# Patient Record
Sex: MALE | Race: WHITE | NOT HISPANIC OR LATINO | URBAN - METROPOLITAN AREA
[De-identification: names, ages, dates, MRNs, and addresses within clinical notes are randomized per-mention and may not be internally consistent; named-entity substitution may affect disease eponyms.]

---

## 2017-10-25 ENCOUNTER — IMPORTED ENCOUNTER (OUTPATIENT)
Dept: URBAN - METROPOLITAN AREA CLINIC 1 | Facility: CLINIC | Age: 57
End: 2017-10-25

## 2017-10-25 PROBLEM — H52.03: Noted: 2017-10-25

## 2017-10-25 PROBLEM — H52.4: Noted: 2017-10-25

## 2017-10-25 PROCEDURE — S0621 ROUTINE OPHTHALMOLOGICAL EXA: HCPCS

## 2017-10-25 NOTE — PATIENT DISCUSSION
1.  Hyperopia: Rx was given for corrective spectacles if indicated. 2.  [id number]. Dry Eyes w/ PEK OU 4.  K Scar OU 5. Cataract OU Return for an appointment in 1 week cc with Dr. Ren Méndez.

## 2017-11-01 ENCOUNTER — IMPORTED ENCOUNTER (OUTPATIENT)
Dept: URBAN - METROPOLITAN AREA CLINIC 1 | Facility: CLINIC | Age: 57
End: 2017-11-01

## 2018-03-01 NOTE — PATIENT DISCUSSION
Dysport recommended. Will follow up in 2 weeks and will perform at that time. Pt is having acupuncture done today and would like to wait.

## 2018-03-01 NOTE — PROCEDURE NOTE: CLINICAL
PROCEDURE NOTE: Restylane refyne ( marinonette lines and nasolabial folds) OU. Diagnosis: Rhytids. Prior to the treatment, the risks/benefits/alternatives were discussed. The patient wished to proceed with the procedure. Lot #: * Expiration date: * Total ml used: * Refer to template for location and number of injections. Patient tolerated the procedure well. There were no complications, post procedure instructions were given. Claudia Gomez

## 2018-03-16 NOTE — PROCEDURE NOTE: CLINICAL
PROCEDURE NOTE: Restylane defyne OU. Diagnosis: Rhytids. Prior to the treatment, the risks/benefits/alternatives were discussed. The patient wished to proceed with the procedure. Lot #: [id number]* Expiration date: 6/30/2019 Total ml used: 1 Refer to template for location and number of injections. Patient tolerated the procedure well. There were no complications, post procedure instructions were given. Chinedu Greening PROCEDURE NOTE: Dysport (glabella only) OU. Diagnosis: Rhytids. Prior to treatment, the risks/benefits/alternatives were discussed. The patient wished to proceed with procedure. Refer to template for location and amounts of injections. Botox was injected at each site without complications. Lot #: null. Expiration Date: . EXP. Inventory Id: null. Total Units Used: *. Total Units Wasted: *. Patient tolerated procedure well. There were no complications. Post procedure instructions given. Chinedu Greening

## 2018-03-16 NOTE — PROCEDURE NOTE: CLINICAL
PROCEDURE NOTE: Restylane defyne OU. Diagnosis: Rhytids. Prior to the treatment, the risks/benefits/alternatives were discussed. The patient wished to proceed with the procedure. Lot #: [id number]* Expiration date: 6/30/2019 Total ml used: 1 Refer to template for location and number of injections. Patient tolerated the procedure well. There were no complications, post procedure instructions were given. Chinedu Greening PROCEDURE NOTE: Dysport (glabella only) OU. Diagnosis: Rhytids. Prior to treatment, the risks/benefits/alternatives were discussed. The patient wished to proceed with procedure. Refer to template for location and amounts of injections. Botox was injected at each site without complications. Lot #: null. Expiration Date: . EXP. Inventory Id: null. Total Units Used: *. Total Units Wasted: *. Patient tolerated procedure well. There were no complications. Post procedure instructions given. Chinedu Greening

## 2018-10-31 ENCOUNTER — IMPORTED ENCOUNTER (OUTPATIENT)
Dept: URBAN - METROPOLITAN AREA CLINIC 1 | Facility: CLINIC | Age: 58
End: 2018-10-31

## 2018-10-31 PROBLEM — H52.4: Noted: 2018-10-31

## 2018-10-31 PROBLEM — H52.03: Noted: 2018-10-31

## 2018-10-31 PROCEDURE — S0621 ROUTINE OPHTHALMOLOGICAL EXA: HCPCS

## 2018-10-31 NOTE — PATIENT DISCUSSION
1.  Hyperopia OU -- Finalized Glasses MRx was given to patient today for corrective spectacles if indicated2. Presbyopia OU 3. Cataracts OU -- Observe  4. Dry Eyes w/ PEK OU -- Recommend the frequent use of OTC AT's BID-QID OU 5. Corneal Scar's OU (possibly early Band Keratopathy OU) -- Observe  Trial CTL given to patient today. Return for an appointment in 1 WK for a CC OU with Dr. Linda Lux.

## 2018-11-09 ENCOUNTER — IMPORTED ENCOUNTER (OUTPATIENT)
Dept: URBAN - METROPOLITAN AREA CLINIC 1 | Facility: CLINIC | Age: 58
End: 2018-11-09

## 2018-11-09 NOTE — PATIENT DISCUSSION
1. CC Today OU -- Good Fit Comfort and Vision OU. Trial of monthly CTL given to patient today. Return for an appointment in 1 YR for a 36 / CC OU with Dr. Durga Rincon. Finalized both daily & monthly CTL Rx & given to patient today (patient to fill / use which CTL he prefers).

## 2018-12-10 ENCOUNTER — IMPORTED ENCOUNTER (OUTPATIENT)
Dept: URBAN - METROPOLITAN AREA CLINIC 1 | Facility: CLINIC | Age: 58
End: 2018-12-10

## 2018-12-10 PROBLEM — H16.002: Noted: 2018-12-10

## 2018-12-10 PROBLEM — S05.02XA: Noted: 2018-12-10

## 2018-12-10 PROCEDURE — 92012 INTRM OPH EXAM EST PATIENT: CPT

## 2018-12-10 NOTE — PATIENT DISCUSSION
1.  Corneal ulcer OS: No sign of CTL in OS. Begin Tobradex QID OS. Return immediately if ulcer larger or symptoms worsen. No contact lens use. 2.  Corneal Abrasion OS (Active) : PF ATs Q1-2H OS. 3.  Cataracts OU 4. Dry Eyes w/ PEK OU 5. Corneal Scar's OU (possibly early Band Keratopathy OU)Return for an appointment in 1 week 10 with Dr. Maribel Stafford.

## 2019-11-14 ENCOUNTER — IMPORTED ENCOUNTER (OUTPATIENT)
Dept: URBAN - METROPOLITAN AREA CLINIC 1 | Facility: CLINIC | Age: 59
End: 2019-11-14

## 2019-11-14 PROBLEM — H52.03: Noted: 2019-11-14

## 2019-11-14 PROBLEM — H52.4: Noted: 2019-11-14

## 2019-11-14 PROCEDURE — S0621 ROUTINE OPHTHALMOLOGICAL EXA: HCPCS

## 2019-11-14 NOTE — PATIENT DISCUSSION
1.  Hyperopia/ Presbyopia- MRX for glasses given. 2.  Cataracts OU -- Observe  3. Dry Eyes w/ PEK OU -- Recommend the frequent use of AT's BID-QID OU 4. Corneal Scar's OU (possibly early Band Keratopathy OU) -- Observe MRX for glasses given. Return for an appointment in 1 year 40/cc with Dr. Christopher Russell.

## 2019-11-25 ENCOUNTER — IMPORTED ENCOUNTER (OUTPATIENT)
Dept: URBAN - METROPOLITAN AREA CLINIC 1 | Facility: CLINIC | Age: 59
End: 2019-11-25

## 2019-11-25 NOTE — PATIENT DISCUSSION
CC todayChanges have been made to CTL RX at today's visit. Return for an appointment in 1 WK for CC with Dr. Bin Boyd.

## 2020-12-07 ENCOUNTER — IMPORTED ENCOUNTER (OUTPATIENT)
Dept: URBAN - METROPOLITAN AREA CLINIC 1 | Facility: CLINIC | Age: 60
End: 2020-12-07

## 2020-12-07 PROBLEM — H52.4: Noted: 2020-12-07

## 2020-12-07 PROBLEM — H52.03: Noted: 2020-12-07

## 2020-12-07 PROBLEM — H52.222: Noted: 2020-12-07

## 2020-12-07 PROCEDURE — S0621 ROUTINE OPHTHALMOLOGICAL EXA: HCPCS

## 2020-12-07 NOTE — PATIENT DISCUSSION
1.  Hyperopia- Rx was given for corrective spectacles if indicated. 2.  Astigmatism OS3. Presbyopia4. Cataracts OU- Observe  5. Dry Eyes w/ PEK OU- Recommend the frequent use of AT's BID-QID OU 6. Corneal Scar's OU (possibly early Band Keratopathy OU)- Observe CTL MRx finializedReturn for an appointment in 1 yr 40/CC with Dr. Charley Rodriguez.

## 2021-08-04 ENCOUNTER — IMPORTED ENCOUNTER (OUTPATIENT)
Dept: URBAN - METROPOLITAN AREA CLINIC 1 | Facility: CLINIC | Age: 61
End: 2021-08-04

## 2021-08-04 PROBLEM — H17.89: Noted: 2021-08-04

## 2021-08-04 PROBLEM — H11.32: Noted: 2021-08-04

## 2021-08-04 PROCEDURE — 99213 OFFICE O/P EST LOW 20 MIN: CPT

## 2021-08-04 NOTE — PATIENT DISCUSSION
1.  Subconjunctival hemorrhage OS -- Reassurance given. Condition discussed with patient. 2.  Corneal Opacity OS -- 3mm. Continue to monitor. 3.  MELL w/ PEK OU -- Cont ATs TID OU routinely. 4.  Nuclear Cataract OU -- Observe. 5.  Central Corneal Scar OU -- Possible Early Band K. Observe. 6.  CTL wear -- DT1 MF. *Pt to d/c CTLs for 1-2 weeks then may resume wear. Return for an appointment in December 40/cc with Dr. Marsh.

## 2021-12-08 ENCOUNTER — IMPORTED ENCOUNTER (OUTPATIENT)
Dept: URBAN - METROPOLITAN AREA CLINIC 1 | Facility: CLINIC | Age: 61
End: 2021-12-08

## 2021-12-08 PROBLEM — H52.4: Noted: 2021-12-08

## 2021-12-08 PROBLEM — H52.03: Noted: 2021-12-08

## 2021-12-08 PROCEDURE — S0621 ROUTINE OPHTHALMOLOGICAL EXA: HCPCS

## 2021-12-08 NOTE — PATIENT DISCUSSION
1.  Hyperopia: Rx was given for corrective spectacles if indicated. 2.  Presbyopia 3. MELL w/ PEK OU -- Cont ATs TID OU routinely. 4.  Nuclear Cataract OU -- Observe. 5.  Central Corneal Scar OU -- Possible Early Band K. Observe. 7.  Corneal Opacity OS -- Continue to monitor. CTL wear -- DT1 MF. Doing well. Finalized CTL Rx today. Return for an appointment in 6 months for a 30 with Dr. Guerita Sheldon. Return for an appointment in 1 year for a 40/cc with Dr. Guerita Sheldon.

## 2022-03-03 ENCOUNTER — EMERGENCY VISIT (OUTPATIENT)
Dept: URBAN - METROPOLITAN AREA CLINIC 1 | Facility: CLINIC | Age: 62
End: 2022-03-03

## 2022-03-03 DIAGNOSIS — H16.001: ICD-10-CM

## 2022-03-03 PROCEDURE — 92012 INTRM OPH EXAM EST PATIENT: CPT

## 2022-03-03 ASSESSMENT — TONOMETRY
OS_IOP_MMHG: 15
OD_IOP_MMHG: 15

## 2022-03-03 ASSESSMENT — VISUAL ACUITY
OS_SC: 20/40-1
OD_SC: 20/40-2

## 2022-03-10 ENCOUNTER — FOLLOW UP (OUTPATIENT)
Dept: URBAN - METROPOLITAN AREA CLINIC 1 | Facility: CLINIC | Age: 62
End: 2022-03-10

## 2022-03-10 DIAGNOSIS — H16.001: ICD-10-CM

## 2022-03-10 PROCEDURE — 92012 INTRM OPH EXAM EST PATIENT: CPT

## 2022-03-10 ASSESSMENT — TONOMETRY
OS_IOP_MMHG: 16
OD_IOP_MMHG: 17

## 2022-03-10 ASSESSMENT — VISUAL ACUITY
OD_SC: 20/40-2
OS_SC: 20/40

## 2022-03-30 NOTE — PATIENT DISCUSSION
Uses MFs and likes her current combination.  Has tried changing before and always comes back to current rx.

## 2022-03-30 NOTE — PATIENT DISCUSSION
Suspect due to cupping asymmetry.  Kwan Alas 75 OCT stable relative to 2019.  Told patient we'll check it again in 2024 and assess for changes but would appear to be low risk at this time.

## 2022-04-08 ASSESSMENT — VISUAL ACUITY
OS_SC: 20/40-1
OS_SC: 20/30-1
OS_CC: J2
OD_SC: 20/25-1
OU_CC: J2
OU_SC: 20/25
OS_SC: 20/20
OD_SC: 20/25-1
OU_CC: J1
OS_CC: J1
OD_SC: 20/25
OD_CC: J1
OD_CC: 20/40
OD_CC: J2
OS_SC: 20/25-1
OS_CC: J2
OS_CC: J2
OD_SC: 20/25
OS_SC: 20/30-1
OD_SC: 20/25-1
OS_SC: 20/30
OD_CC: 20/40
OD_CC: J2
OS_SC: 20/50-1
OU_CC: J2
OS_SC: 20/20
OS_SC: 20/40-1
OD_CC: J2
OD_CC: J2
OS_CC: J2
OS_SC: 20/40
OS_CC: J1
OU_SC: 20/20
OD_CC: J3
OD_CC: J2
OS_CC: J2
OS_CC: J2
OD_SC: 20/20
OD_SC: 20/20
OU_SC: 20/25-1
OU_CC: J2
OD_CC: J2
OD_SC: 20/25-1
OS_CC: 20/25

## 2022-04-08 ASSESSMENT — TONOMETRY
OD_IOP_MMHG: 16
OD_IOP_MMHG: 15
OD_IOP_MMHG: 15
OS_IOP_MMHG: 12
OS_IOP_MMHG: 14
OD_IOP_MMHG: 14
OD_IOP_MMHG: 14
OS_IOP_MMHG: 16
OD_IOP_MMHG: 12
OD_IOP_MMHG: 12
OS_IOP_MMHG: 15
OS_IOP_MMHG: 14

## 2022-04-08 ASSESSMENT — KERATOMETRY
OS_K1POWER_DIOPTERS: 41.50
OD_K1POWER_DIOPTERS: 42.25
OS_K2POWER_DIOPTERS: 41.00
OD_K2POWER_DIOPTERS: 41.25
OD_AXISANGLE2_DEGREES: 074
OS_AXISANGLE_DEGREES: 030
OD_AXISANGLE_DEGREES: 164
OS_AXISANGLE2_DEGREES: 120

## 2022-08-23 PROBLEM — R07.9 CHEST PAIN: Status: ACTIVE | Noted: 2022-08-23

## 2022-08-23 PROBLEM — L70.9 ACNE: Status: ACTIVE | Noted: 2022-08-23

## 2022-08-23 PROBLEM — J45.909 REACTIVE AIRWAY DISEASE: Status: ACTIVE | Noted: 2022-08-23

## 2022-08-23 PROBLEM — L57.0 ACTINIC KERATOSIS: Status: ACTIVE | Noted: 2022-08-23

## 2022-08-23 PROBLEM — F33.9 CHRONIC MAJOR DEPRESSIVE DISORDER, RECURRENT EPISODE (HCC): Status: ACTIVE | Noted: 2022-08-23

## 2022-08-23 PROBLEM — M79.10 MYALGIA: Status: ACTIVE | Noted: 2022-08-23

## 2022-08-23 PROBLEM — F41.1 GENERALIZED ANXIETY DISORDER: Status: ACTIVE | Noted: 2022-08-23

## 2022-08-23 PROBLEM — F34.1 EARLY ONSET DYSTHYMIA: Status: ACTIVE | Noted: 2022-08-23

## 2022-08-23 PROBLEM — K59.00 CONSTIPATION: Status: ACTIVE | Noted: 2022-08-23

## 2022-08-23 PROBLEM — R05.3 COUGH, PERSISTENT: Status: ACTIVE | Noted: 2022-08-23

## 2022-08-23 PROBLEM — J45.990 EXERTIONAL ASTHMA: Status: ACTIVE | Noted: 2022-08-23

## 2022-08-23 PROBLEM — R09.82 POSTNASAL DRIP: Status: ACTIVE | Noted: 2022-08-23

## 2022-08-23 PROBLEM — N28.1 RENAL CYST: Status: ACTIVE | Noted: 2022-08-23

## 2022-12-13 ENCOUNTER — COMPREHENSIVE EXAM (OUTPATIENT)
Dept: URBAN - METROPOLITAN AREA CLINIC 1 | Facility: CLINIC | Age: 62
End: 2022-12-13

## 2022-12-13 PROCEDURE — 92014 COMPRE OPH EXAM EST PT 1/>: CPT

## 2022-12-13 PROCEDURE — 92015 DETERMINE REFRACTIVE STATE: CPT

## 2022-12-13 PROCEDURE — 92310 CONTACT LENS FITTING OU: CPT

## 2022-12-13 ASSESSMENT — VISUAL ACUITY
OS_CC: 20/30
OD_CC: 20/30+1
OS_CC: J2
OD_CC: J2

## 2022-12-13 ASSESSMENT — TONOMETRY
OS_IOP_MMHG: 16
OD_IOP_MMHG: 16

## 2022-12-13 NOTE — PATIENT DISCUSSION
Patient came in today wearing DT1 MF +1.25 Sph MED OU. He states that he may be having some difficulty with reading with these lenses and would like to switch a monthly lens. Switched patient to [employer] MF +1.25 Sph MED add OD and +1.50 Sph HIGH add OS. Trials given, will have patient follow-up in one week.

## 2022-12-29 ENCOUNTER — CONTACT LENSES/GLASSES VISIT (OUTPATIENT)
Dept: URBAN - METROPOLITAN AREA CLINIC 1 | Facility: CLINIC | Age: 62
End: 2022-12-29

## 2022-12-29 PROCEDURE — 92310-F CONTACT LENS FITTING FOLLOW UP

## 2022-12-29 RX ORDER — TOBRAMYCIN / DEXAMETHASONE 3; .5 MG/ML; MG/ML: 1 SUSPENSION/ DROPS OPHTHALMIC TWICE A DAY

## 2022-12-29 NOTE — PATIENT DISCUSSION
CTL finalized today (+1.25sph ADD Med OD, +1.50sph Add High OS) DT1 MF and Air [employer]ix MF. Patient may proceed with whichever brand he prefers. Per [facility] erx'd Tobradex ST due to patient having h/o Corneal Ulcer OD.

## 2023-08-05 ENCOUNTER — HOSPITAL ENCOUNTER (OUTPATIENT)
Facility: HOSPITAL | Age: 63
Discharge: HOME | End: 2023-08-05
Payer: COMMERCIAL

## 2023-08-05 DIAGNOSIS — N28.1 CYST OF KIDNEY, ACQUIRED: ICD-10-CM

## 2023-08-05 PROCEDURE — 76770 US EXAM ABDO BACK WALL COMP: CPT

## 2023-12-20 ENCOUNTER — COMPREHENSIVE EXAM (OUTPATIENT)
Dept: URBAN - METROPOLITAN AREA CLINIC 2 | Facility: CLINIC | Age: 63
End: 2023-12-20

## 2023-12-20 DIAGNOSIS — H52.4: ICD-10-CM

## 2023-12-20 DIAGNOSIS — H52.221: ICD-10-CM

## 2023-12-20 DIAGNOSIS — H17.9: ICD-10-CM

## 2023-12-20 DIAGNOSIS — H52.03: ICD-10-CM

## 2023-12-20 PROCEDURE — 92015 DETERMINE REFRACTIVE STATE: CPT

## 2023-12-20 PROCEDURE — 92014 COMPRE OPH EXAM EST PT 1/>: CPT

## 2023-12-20 ASSESSMENT — VISUAL ACUITY
OS_CC: J2
OS_CC: 20/25-2
OD_CC: J2
OD_CC: 20/25

## 2023-12-20 ASSESSMENT — KERATOMETRY
OS_AXISANGLE2_DEGREES: 81
OD_AXISANGLE_DEGREES: 040
OD_K1POWER_DIOPTERS: 42.00
OD_K2POWER_DIOPTERS: 42.25
OS_AXISANGLE_DEGREES: 171
OD_AXISANGLE2_DEGREES: 130
OS_K2POWER_DIOPTERS: 42.00
OS_K1POWER_DIOPTERS: 41.50

## 2023-12-20 ASSESSMENT — TONOMETRY
OS_IOP_MMHG: 15
OD_IOP_MMHG: 16